# Patient Record
Sex: MALE | Race: WHITE | NOT HISPANIC OR LATINO | Employment: FULL TIME | URBAN - METROPOLITAN AREA
[De-identification: names, ages, dates, MRNs, and addresses within clinical notes are randomized per-mention and may not be internally consistent; named-entity substitution may affect disease eponyms.]

---

## 2017-01-09 RX ORDER — AZELASTINE HCL 205.5 UG/1
2 SPRAY NASAL 2 TIMES DAILY
Qty: 30 ML | Refills: 3 | Status: SHIPPED | OUTPATIENT
Start: 2017-01-09 | End: 2017-10-02 | Stop reason: SDUPTHER

## 2017-02-17 RX ORDER — MONTELUKAST SODIUM 10 MG/1
10 TABLET ORAL NIGHTLY
Qty: 90 TABLET | Refills: 1 | Status: SHIPPED | OUTPATIENT
Start: 2017-02-17 | End: 2017-02-20 | Stop reason: SDUPTHER

## 2017-02-20 RX ORDER — MONTELUKAST SODIUM 10 MG/1
10 TABLET ORAL NIGHTLY
Qty: 90 TABLET | Refills: 1 | Status: SHIPPED | OUTPATIENT
Start: 2017-02-20 | End: 2017-02-21 | Stop reason: SDUPTHER

## 2017-02-21 RX ORDER — MONTELUKAST SODIUM 10 MG/1
10 TABLET ORAL NIGHTLY
Qty: 90 TABLET | Refills: 1 | Status: SHIPPED | OUTPATIENT
Start: 2017-02-21 | End: 2018-04-23 | Stop reason: CLARIF

## 2017-05-12 ENCOUNTER — OFFICE VISIT (OUTPATIENT)
Dept: INTERNAL MEDICINE | Facility: CLINIC | Age: 47
End: 2017-05-12

## 2017-05-12 VITALS
SYSTOLIC BLOOD PRESSURE: 122 MMHG | WEIGHT: 250 LBS | TEMPERATURE: 98.7 F | DIASTOLIC BLOOD PRESSURE: 80 MMHG | BODY MASS INDEX: 35 KG/M2 | OXYGEN SATURATION: 98 % | HEART RATE: 87 BPM | HEIGHT: 71 IN | RESPIRATION RATE: 16 BRPM

## 2017-05-12 DIAGNOSIS — I10 ESSENTIAL HYPERTENSION: Primary | ICD-10-CM

## 2017-05-12 DIAGNOSIS — J30.1 SEASONAL ALLERGIC RHINITIS DUE TO POLLEN: ICD-10-CM

## 2017-05-12 PROCEDURE — 99214 OFFICE O/P EST MOD 30 MIN: CPT | Performed by: INTERNAL MEDICINE

## 2017-05-17 ENCOUNTER — RESULTS ENCOUNTER (OUTPATIENT)
Dept: INTERNAL MEDICINE | Facility: CLINIC | Age: 47
End: 2017-05-17

## 2017-05-17 DIAGNOSIS — I10 ESSENTIAL HYPERTENSION: ICD-10-CM

## 2017-10-02 RX ORDER — AZELASTINE HCL 205.5 UG/1
SPRAY NASAL
Qty: 3 EACH | Refills: 0 | Status: SHIPPED | OUTPATIENT
Start: 2017-10-02 | End: 2018-12-20 | Stop reason: SDUPTHER

## 2017-10-02 RX ORDER — AZELASTINE HCL 205.5 UG/1
SPRAY NASAL
Qty: 30 ML | Refills: 3 | Status: SHIPPED | OUTPATIENT
Start: 2017-10-02 | End: 2018-10-08 | Stop reason: SDUPTHER

## 2017-12-18 RX ORDER — LISINOPRIL AND HYDROCHLOROTHIAZIDE 20; 12.5 MG/1; MG/1
TABLET ORAL
Qty: 90 TABLET | Refills: 3 | Status: SHIPPED | OUTPATIENT
Start: 2017-12-18 | End: 2019-02-09 | Stop reason: SDUPTHER

## 2018-02-21 ENCOUNTER — TELEPHONE (OUTPATIENT)
Dept: INTERNAL MEDICINE | Facility: CLINIC | Age: 48
End: 2018-02-21

## 2018-02-21 DIAGNOSIS — G47.33 OSA (OBSTRUCTIVE SLEEP APNEA): Primary | ICD-10-CM

## 2018-02-21 NOTE — TELEPHONE ENCOUNTER
Order entered for home sleep study to qualify.    ----- Message from Clarisse Honeycutt sent at 2/21/2018  8:47 AM EST -----  Regarding: SLEEP STUDY  Contact: [phone]  [facility]    Patient needs order for another sleep study here at [facility]. He is having a problem with his cpap equipment and they told him that he has to have another study to make sure that they order the correct equipment.    Thanks!  clarisse

## 2018-03-16 ENCOUNTER — OFFICE VISIT (OUTPATIENT)
Dept: SLEEP MEDICINE | Facility: HOSPITAL | Age: 48
End: 2018-03-16
Attending: INTERNAL MEDICINE

## 2018-03-16 ENCOUNTER — TELEPHONE (OUTPATIENT)
Dept: SLEEP MEDICINE | Facility: HOSPITAL | Age: 48
End: 2018-03-16

## 2018-03-16 VITALS
BODY MASS INDEX: 36.96 KG/M2 | SYSTOLIC BLOOD PRESSURE: 135 MMHG | HEART RATE: 83 BPM | DIASTOLIC BLOOD PRESSURE: 74 MMHG | WEIGHT: 264 LBS | HEIGHT: 71 IN

## 2018-03-16 DIAGNOSIS — Z99.89 OSA ON CPAP: Primary | ICD-10-CM

## 2018-03-16 DIAGNOSIS — E66.9 OBESITY (BMI 30-39.9): ICD-10-CM

## 2018-03-16 DIAGNOSIS — G47.10 HYPERSOMNIA WITH SLEEP APNEA: ICD-10-CM

## 2018-03-16 DIAGNOSIS — G47.33 OSA ON CPAP: Primary | ICD-10-CM

## 2018-03-16 DIAGNOSIS — G47.30 HYPERSOMNIA WITH SLEEP APNEA: ICD-10-CM

## 2018-03-16 DIAGNOSIS — Z72.820 SLEEP DEPRIVATION: ICD-10-CM

## 2018-03-16 DIAGNOSIS — I10 BENIGN ESSENTIAL HTN: ICD-10-CM

## 2018-03-16 PROCEDURE — G0463 HOSPITAL OUTPT CLINIC VISIT: HCPCS

## 2018-03-16 NOTE — PROGRESS NOTES
Sleep Consultation    Patient Name: Jitendra Castillo  Age/Sex: 48 y.o. male  : 1970  MRN: [id number]    Date of Encounter Visit: 2018  Encounter Provider: Romel Vieira MD  Referring Provider: Romel Vieira MD  Place of Service: [facility]  Patient Care Team:  Martita Nunes MD as PCP - General (Internal Medicine & Pediatrics)    Subjective:     Reason for Consult: follow up and re evaluation on MELLO    History of Present Illness:  Jitendra Castillo is a 48 y.o. male is here for evaluation of MELLO .Patient was evaluated at the [place] [facility] back in  when he was seen by Dr. Miramontes and he had moderate sleep apnea with AHI of 25 with good control on a CPAP of 10, back then his body weight was reported at 234 pound as compared to 264 pounds today he did  Patient was started on a CPAP of 10 and he felt better on it slipped better with it and was compliant with it as evident on his download today.  His machine is old and is making some weird noises, he has some residual snoring that is reported while on the machine lately per his wife.  Patient also need to have supplies filters and mask etc., and his [employer] company has requested a physician order in order to be able to supply them.  Patient is having some hypersomnia at this point despite being on the CPAP and his Row[place] sleepiness scale is abnormal at 12 however based on the download you can see that on his days off he sleeps for more than 8-9 hours reflecting a chronic sleep deprivation that he is trying to compensate for.  Besides the hypertension patient denies any other active medical problems and his blood pressure seems to be well controlled on medication.    Please see sleep questionnaire on page 2 for more details.   Patient complains of daytime fatigue and sleepiness with an [place] Sleepiness Scale (ESS) of 12.  Patient complaints were a lot worse and they did improve with the CPAP  Denies any symptoms of  restless leg syndrome.   Patient denies any cataplexy, sleep paralysis or other symptoms to suggest narcolepsy.  Patient denies any parasomnias.  Denies any history of seizure disorder or recent head trauma.  Patient spends adequate time in bed as discussed above with resulting chronic sleep deprivation  Comorbidities include: Obesity and essential systemic hypertension    Review of Systems:   A twelve-system review was conducted and was negative .   Please refer to page 4 of on the sleep questionnaire for more details on system review findings.    Past Medical History:  Past Medical History:   Diagnosis Date   • Allergic    • Hypertension        Past Surgical History:   Procedure Laterality Date   • FINGER SURGERY         Home Medications:     Current Outpatient Prescriptions:   •  azelastine (ASTEPRO) 0.15 % solution nasal spray, USE TWO SPRAY(S) INTO EACH NOSTRIL TWICE DAILY, Disp: 3 each, Rfl: 0  •  azelastine (ASTEPRO) 0.15 % solution nasal spray, USE TWO SPRAY(S) INTO EACH NOSTRIL TWICE DAILY, Disp: 30 mL, Rfl: 3  •  lisinopril-hydrochlorothiazide (PRINZIDE,ZESTORETIC) 20-12.5 MG per tablet, TAKE ONE TABLET BY MOUTH ONCE DAILY, Disp: 90 tablet, Rfl: 3  •  montelukast (SINGULAIR) 10 MG tablet, Take 1 tablet by mouth Every Night., Disp: 90 tablet, Rfl: 1  •  Multiple Vitamin (DAILY VITAMIN PO), Take  by mouth Daily., Disp: , Rfl:     Allergies:  No Known Allergies    Past Social History:  Social History     Social History   • Marital status:      Social History Main Topics   • Smoking status: Former Smoker     Packs/day: 2.00     Years: 5.00     Types: Cigarettes     Quit date: 1993   • Alcohol use Yes      Comment: 5 per week    • Drug use: No   • Sexual activity: Yes     Partners: Female     Other Topics Concern   • Not on file       Past Family History:  Family History   Problem Relation Age of Onset   • Heart disease Father    • Hypertension Father    • Heart attack Father      s/p cabg in late 50's  "  • Alzheimer's disease Mother    • No Known Problems Sister    • Hypertension Brother    • No Known Problems Maternal Grandmother    • No Known Problems Maternal Grandfather    • No Known Problems Paternal Grandmother    • No Known Problems Paternal Grandfather    • Nephrolithiasis Brother        Objective:   Done and documented on sleep disorders center physical examination sheet, please refer to the hand written note on records for details about the pertinent negative findings.    Vital Signs:   Visit Vitals  /74   Pulse 83   Ht 180.3 cm (71\")   Wt 120 kg (264 lb)   BMI 36.82 kg/m²     Wt Readings from Last 3 Encounters:   03/16/18 120 kg (264 lb)   05/12/17 113 kg (250 lb)   11/04/16 112 kg (247 lb 9.6 oz)     Neck Circumference: 18.5 inches    Physical Exam:   General: AAOx3. Normal mood and affect.   HEENT:  Conjunctiva normal.  PERRLA.  Moist mucous membranes.  Septum midline. Mallampati IV airway.  Large uvula   Neck: Neck supple. Trachea midline.  Normal thyroid.  Normal dentition  LUNGS: Non-labored breathing. CTAB.  No wheezing.  HEART: regular rate and rhythm. No murmur. Normal s1/s2.  EXTREMITIES: No edema. No cyanosis or clubbing. Normal gait.    Diagnostic Data:  NPSG/CPAP split study on 2/27/2012:      Compliance download 3/15/18: Over the last 90 days patient has 100% compliance with average nightly use of 6 hours and 57 minutes with atypical pattern showing more sleep on the weekends suggestive of compensation for chronic sleep deprivation.  His CPAP machine is an old machine and does not have any feedback about the air leak or the residual apneas.      Assessment and Plan:       ICD-10-CM ICD-9-CM   1. MELLO on CPAP G47.33 327.23    Z99.89 V46.8   2. Hypersomnia with sleep apnea G47.10 780.53    G47.30    3. Obesity (BMI 30-39.9) E66.9 278.00   4. Benign essential HTN I10 401.1   5. Sleep deprivation Z72.820 V69.4       Recommendations:     Patient has gained nearly 30 pounds since his initial " titration study and his wife is reporting some snoring on his current CPAP at 10 cm his machine is also more than 5 years old.  The patient reporting that the machine is making some noises and it is about time to replace it and I would suggest to go with an auto CPAP 10-20 to compensate for the weight gain.  We'll also do an overnight oximetry on the newer machine to make sure that there is no more desaturation or hypoxemia.  Patient was advised about the impact of weight on sleep apnea and benefit of weight loss and weight loss is recommended especially given the recent increase in his weight.  Patient had some persistent hypersomnia which might be secondary to his chronic sleep deprivation and was advised to spend adequate time in bed.  We'll proceed with the new machine and the overnight oximetry and will follow-up in 2 months with further recommendation accordingly, meanwhile patient was also instructed to ask the wife for a feedback about whether he still snores or not on the newer machine  low up within 31-90 days after starting CPAP for compliance review.     No orders of the defined types were placed in this encounter.      Return in about 2 months (around 5/16/2018).    Romel Vieira MD   [facility]   03/16/18  9:54 AM    Dictated utilizing Dragon dictation:  Much of this encounter note is an electronic transcription/translation of spoken language to printed text. The electronic translation of spoken language may permit erroneous, or at times, nonsensical words or phrases to be inadvertently transcribed; Although I have reviewed the note for such errors, some may still exist.

## 2018-04-23 RX ORDER — MONTELUKAST SODIUM 10 MG/1
TABLET ORAL
Qty: 90 TABLET | Refills: 1 | Status: SHIPPED | OUTPATIENT
Start: 2018-04-23 | End: 2018-12-31 | Stop reason: SDUPTHER

## 2018-05-18 ENCOUNTER — OFFICE VISIT (OUTPATIENT)
Dept: SLEEP MEDICINE | Facility: HOSPITAL | Age: 48
End: 2018-05-18
Attending: INTERNAL MEDICINE

## 2018-05-18 VITALS
HEART RATE: 74 BPM | BODY MASS INDEX: 35.84 KG/M2 | HEIGHT: 71 IN | SYSTOLIC BLOOD PRESSURE: 123 MMHG | DIASTOLIC BLOOD PRESSURE: 71 MMHG | WEIGHT: 256 LBS

## 2018-05-18 DIAGNOSIS — Z72.820 SLEEP DEPRIVATION: ICD-10-CM

## 2018-05-18 DIAGNOSIS — Z99.89 OSA ON CPAP: Primary | ICD-10-CM

## 2018-05-18 DIAGNOSIS — G47.30 HYPERSOMNIA WITH SLEEP APNEA: ICD-10-CM

## 2018-05-18 DIAGNOSIS — I10 BENIGN ESSENTIAL HTN: ICD-10-CM

## 2018-05-18 DIAGNOSIS — G47.10 HYPERSOMNIA WITH SLEEP APNEA: ICD-10-CM

## 2018-05-18 DIAGNOSIS — E66.9 OBESITY (BMI 30-39.9): ICD-10-CM

## 2018-05-18 DIAGNOSIS — G47.33 OSA ON CPAP: Primary | ICD-10-CM

## 2018-05-18 PROCEDURE — G0463 HOSPITAL OUTPT CLINIC VISIT: HCPCS

## 2018-05-18 NOTE — PROGRESS NOTES
Sleep Disorder Follow Up    Patient Name: Jitendra Castillo  Age/Sex: 48 y.o. male  : 1970  MRN: [id number]    Date of Encounter Visit: 18  Referring Provider: Romel Vieira MD  Place of Service: [facility]  Patient Care Team:  Martita Nunes MD as PCP - General (Internal Medicine & Pediatrics)    PROBLEM LIST:  1. MELLO on CPAP      2. Hypersomnia with sleep apnea      3. Obesity (BMI 30-39.9)   4. Benign essential HTN   5. Sleep deprivation       History of Present Illness:  Jitendra Castillo is a 48 y.o. male who was last seen in the office on 3/16/2018 . Patient was evaluated at the [place] [facility] [place] back in  when he was seen by Dr. Miramontes and he had moderate sleep apnea with AHI of 25 with good control on a CPAP of 10, back then his body weight was reported at 234 pound as compared to 264 pounds today he did    Since last visit, patient had his old CPAP machine replaced with an auto CPAP 10-20 instead of a fixed pressure of 10 and he was instructed to increase the time in bed and help offset some of his chronic sleep deprivation.  Patient is feeling better on the CPAP he likes the new machine, he sleeps better, he is still sleep deprived however, and he still have a borderline hypersomnia which should not improve without increase in the total sleep time a respective of the CPAP usage.    Patient is on  CPAP and uses it every night with no complaints from the mask or the pressure.  Patient uses a nasal pillow, which does  fit well. Patient denies any significant complaints regarding the air leak.  No dry mouth.   Patient's equipment supplier is [facility] and last mask replacement was after the recent set up in 2018.  Patient sleeps better and has a deeper sleep with the  CPAP and feels more energy during the day time.  Current  CPAP setting auto 10-20 cm H20.  [place] Sleepiness Scale (ESS) is 10.  Compliance download was reviewed and documented below.  Weight is  up by 12 pounds since last visit.  Other comorbidities include MELLO/obesity/hypertension     Review of Systems:   A ten-system review was conducted and was negative except for some residual sleepiness..   Please refer to the follow up sleep questionnaire page one for details.    Past Medical History:  Past medical, surgical, social, and family history, except as mentioned above, was unchanged from the last visit.     Past Medical History:   Diagnosis Date   • Allergic    • Hypertension        Past Surgical History:   Procedure Laterality Date   • FINGER SURGERY         Home Medications:     Current Outpatient Prescriptions:   •  azelastine (ASTEPRO) 0.15 % solution nasal spray, USE TWO SPRAY(S) INTO EACH NOSTRIL TWICE DAILY, Disp: 3 each, Rfl: 0  •  azelastine (ASTEPRO) 0.15 % solution nasal spray, USE TWO SPRAY(S) INTO EACH NOSTRIL TWICE DAILY, Disp: 30 mL, Rfl: 3  •  lisinopril-hydrochlorothiazide (PRINZIDE,ZESTORETIC) 20-12.5 MG per tablet, TAKE ONE TABLET BY MOUTH ONCE DAILY, Disp: 90 tablet, Rfl: 3  •  montelukast (SINGULAIR) 10 MG tablet, TAKE ONE TABLET BY MOUTH EVERY NIGHT, Disp: 90 tablet, Rfl: 1  •  Multiple Vitamin (DAILY VITAMIN PO), Take  by mouth Daily., Disp: , Rfl:     Allergies:  No Known Allergies    Past Social History:  Social History     Social History   • Marital status:      Social History Main Topics   • Smoking status: Former Smoker     Packs/day: 2.00     Years: 5.00     Types: Cigarettes     Quit date: 1993   • Alcohol use Yes      Comment: 5 per week    • Drug use: No   • Sexual activity: Yes     Partners: Female     Other Topics Concern   • Not on file       Past Family History:  Family History   Problem Relation Age of Onset   • Heart disease Father    • Hypertension Father    • Heart attack Father         s/p cabg in late 50's   • Alzheimer's disease Mother    • No Known Problems Sister    • Hypertension Brother    • No Known Problems Maternal Grandmother    • No Known Problems  "Maternal Grandfather    • No Known Problems Paternal Grandmother    • No Known Problems Paternal Grandfather    • Nephrolithiasis Brother          Objective:   Done and documented on sleep disorders center physical examination sheet, please refer to hand written note on the chart for details about the other pertinent negative findings.    Vital Signs:   Visit Vitals  /71   Pulse 74   Ht 180.3 cm (71\")   Wt 116 kg (256 lb)   BMI 35.70 kg/m²     Wt Readings from Last 3 Encounters:   05/18/18 116 kg (256 lb)   03/16/18 120 kg (264 lb)   05/12/17 113 kg (250 lb)          Physical Exam:   General: AAOx3. Normal mood and affect.   HEENT:  Moist mucous membranes.  Septum midline. Mallampati IV airway.  Enlarged uvula.   LUNGS: Non-labored breathing. CTAB. No wheezes.  HEART: Regular rate and rhythm. No murmur. Normal s1/s2  EXTREMITIES: no edema. No cyanosis or clubbing. Normal gait    Diagnostic Data:  NPSG/CPAP split study on 2/27/2012:        Compliance download from 4/18/18-5/17/18 showed 100% compliant with average use of 6 hours and 21 minutes per night. Residual AHI of 1.5 on auto CPAP at 10-20 cm H20 with 4 minutes and 48 seconds of average time in large air leak per day. Majority (90%) of the time the pressure is staying at or below 12.8 cm H2O and a mean pressure of 11.1 cm H2O.      Assessment and Plan:       ICD-10-CM ICD-9-CM   1. MELLO on CPAP G47.33 327.23    Z99.89 V46.8   2. Hypersomnia with sleep apnea G47.10 780.53    G47.30    3. Obesity (BMI 30-39.9) E66.9 278.00   4. Benign essential HTN I10 401.1   5. Sleep deprivation Z72.820 V69.4       Recommendations:     Compliant with the CPAP and using it regularly and adequately for medical benefit and the treatment is recommended to be continued  Patient is still sleep deprived and that account for the residual hypersomnia, re-education provided on the negative impact of chronic sleep deprivation with the instruction to increase the time in bed.  Patient " used to have snoring on the old machine but that is no longer an issue on the new CPAP except rarely, the setting on the machine is adequate for optimal control and no further adjustments are needed  Patient is to work on the weight loss  We'll continue to follow-up on a yearly basis or sooner as needed    No orders of the defined types were placed in this encounter.    Return in about 1 year (around 5/18/2019).    Romel Vieira MD   [facility]   05/18/18  12:45 PM    Dictated utilizing Dragon dictation:  Much of this encounter note is an electronic transcription/translation of spoken language to printed text. The electronic translation of spoken language may permit erroneous, or at times, nonsensical words or phrases to be inadvertently transcribed; Although I have reviewed the note for such errors, some may still exist.

## 2018-10-08 ENCOUNTER — OFFICE VISIT (OUTPATIENT)
Dept: INTERNAL MEDICINE | Facility: CLINIC | Age: 48
End: 2018-10-08

## 2018-10-08 ENCOUNTER — HOSPITAL ENCOUNTER (OUTPATIENT)
Dept: GENERAL RADIOLOGY | Facility: HOSPITAL | Age: 48
Discharge: HOME | End: 2018-10-08
Attending: INTERNAL MEDICINE | Admitting: INTERNAL MEDICINE

## 2018-10-08 VITALS
HEART RATE: 89 BPM | OXYGEN SATURATION: 98 % | DIASTOLIC BLOOD PRESSURE: 98 MMHG | SYSTOLIC BLOOD PRESSURE: 140 MMHG | HEIGHT: 71 IN

## 2018-10-08 DIAGNOSIS — M79.672 LEFT FOOT PAIN: Primary | ICD-10-CM

## 2018-10-08 DIAGNOSIS — S92.355A CLOSED NONDISPLACED FRACTURE OF FIFTH METATARSAL BONE OF LEFT FOOT, INITIAL ENCOUNTER: ICD-10-CM

## 2018-10-08 PROCEDURE — 99214 OFFICE O/P EST MOD 30 MIN: CPT | Performed by: INTERNAL MEDICINE

## 2018-10-08 PROCEDURE — 73630 X-RAY EXAM OF FOOT: CPT

## 2018-10-08 NOTE — PROGRESS NOTES
Jitendra Castillo is a 48 y.o. male, who presents with a chief complaint of   Chief Complaint   Patient presents with   • Foot Injury     Rolled L foot yesterday, pain on outside of foot       HPI   Pt rolled foot yesterday on uneven ground.  The lateral side or his left foot hurts.  He can put pressure on heel but not on rest of foot.  He is having to use crutches.  He is taking ibuprofen.  As long as he doesn't walk on foot it's ok.       The following portions of the patient's history were reviewed and updated as appropriate: allergies, current medications, past family history, past medical history, past social history, past surgical history and problem list.    Allergies: Patient has no known allergies.    Review of Systems   Constitutional: Negative.    HENT: Negative.    Eyes: Negative.    Respiratory: Negative.    Cardiovascular: Negative.    Gastrointestinal: Negative.    Endocrine: Negative.    Genitourinary: Negative.    Musculoskeletal:        Ankle pain   Skin: Negative.    Allergic/Immunologic: Negative.    Neurological: Negative.    Hematological: Negative.    Psychiatric/Behavioral: Negative.    All other systems reviewed and are negative.            Wt Readings from Last 3 Encounters:   05/18/18 116 kg (256 lb)   03/16/18 120 kg (264 lb)   05/12/17 113 kg (250 lb)     Temp Readings from Last 3 Encounters:   05/12/17 98.7 °F (37.1 °C) (Oral)     BP Readings from Last 3 Encounters:   10/08/18 140/98   05/18/18 123/71   03/16/18 135/74     Pulse Readings from Last 3 Encounters:   10/08/18 89   05/18/18 74   03/16/18 83     There is no height or weight on file to calculate BMI.  @LASTSAO2(3)@    Physical Exam   Constitutional: He is oriented to person, place, and time. He appears well-developed and well-nourished. No distress.   HENT:   Head: Normocephalic and atraumatic.   Right Ear: External ear normal.   Left Ear: External ear normal.   Nose: Nose normal.   Mouth/Throat: Oropharynx is clear and  moist.   Eyes: Pupils are equal, round, and reactive to light. Conjunctivae and EOM are normal.   Neck: Normal range of motion. Neck supple.   Cardiovascular: Normal rate, regular rhythm, normal heart sounds and intact distal pulses.    Pulmonary/Chest: Effort normal and breath sounds normal. No respiratory distress. He has no wheezes.   Musculoskeletal: He exhibits edema.   On crutches  + ttp over left 5th metatarsal.   Neurological: He is alert and oriented to person, place, and time.   Skin: Skin is warm and dry.   Psychiatric: He has a normal mood and affect. His behavior is normal. Judgment and thought content normal.   Nursing note and vitals reviewed.      Results for orders placed or performed in visit on 09/30/16   Comprehensive Metabolic Panel   Result Value Ref Range    Glucose 84 65 - 99 mg/dL    BUN 8 6 - 20 mg/dL    Creatinine 0.81 0.76 - 1.27 mg/dL    eGFR Non African Am 104 >60 mL/min/1.73    eGFR African Am 125 >60 mL/min/1.73    BUN/Creatinine Ratio 9.9 7.0 - 25.0    Sodium 140 136 - 145 mmol/L    Potassium 3.8 3.5 - 5.2 mmol/L    Chloride 97 (L) 98 - 107 mmol/L    Total CO2 28.2 22.0 - 29.0 mmol/L    Calcium 9.1 8.6 - 10.5 mg/dL    Total Protein 7.8 6.0 - 8.5 g/dL    Albumin 4.70 3.50 - 5.20 g/dL    Globulin 3.1 gm/dL    A/G Ratio 1.5 g/dL    Total Bilirubin 0.7 0.2 - 1.2 mg/dL    Alkaline Phosphatase 61 40 - 129 U/L    AST (SGOT) 27 5 - 40 U/L    ALT (SGPT) 27 5 - 41 U/L   T4, Free   Result Value Ref Range    Free T4 0.96 0.93 - 1.70 ng/dL   TSH   Result Value Ref Range    TSH 3.130 0.270 - 4.200 mIU/mL   Lipid Panel With LDL / HDL Ratio   Result Value Ref Range    Total Cholesterol 173 0 - 200 mg/dL    Triglycerides 103 0 - 150 mg/dL    HDL Cholesterol 54 40 - 60 mg/dL    VLDL Cholesterol 20.6 8 - 32 mg/dL    LDL Cholesterol  98 0 - 100 mg/dL    LDL/HDL Ratio 1.82    CBC & Differential   Result Value Ref Range    WBC 6.99 4.80 - 10.80 10*3/mm3    RBC 5.13 4.70 - 6.10 10*6/mm3    Hemoglobin 16.1  14.0 - 18.0 g/dL    Hematocrit 45.1 42.0 - 52.0 %    MCV 87.9 80.0 - 94.0 fL    MCH 31.4 (H) 27.0 - 31.0 pg    MCHC 35.7 31.0 - 37.0 g/dL    RDW 12.9 11.5 - 14.5 %    Platelets 286 140 - 500 10*3/mm3    Neutrophil Rel % 56.0 45.0 - 70.0 %    Lymphocyte Rel % 31.6 20.0 - 45.0 %    Monocyte Rel % 7.6 3.0 - 8.0 %    Eosinophil Rel % 3.4 0.0 - 4.0 %    Basophil Rel % 1.1 0.0 - 2.0 %    Neutrophils Absolute 3.91 1.50 - 8.30 10*3/mm3    Lymphocytes Absolute 2.21 0.60 - 4.80 10*3/mm3    Monocytes Absolute 0.53 0.00 - 1.00 10*3/mm3    Eosinophils Absolute 0.24 0.10 - 0.30 10*3/mm3    Basophils Absolute 0.08 0.00 - 0.20 10*3/mm3    Immature Granulocyte Rel % 0.3 0.0 - 0.5 %    Immature Grans Absolute 0.02 0.00 - 0.03 10*3/mm3    nRBC 0.0 0.0 - 0.0 /100 WBC           Jitendra was seen today for foot injury.    Diagnoses and all orders for this visit:    Left foot pain  -     XR Foot 3+ View Left      nsaids prn. xrays today.  If foot fractured will get pt walking boot and refer to ortho.        Addendum: pt with fracture of 5th metatarsal.  We were able to get a boot from ortho and pt fitted in boot.  he has a follow up appt tomorrow with Elidia Lewis the ortho NP.  Images reviewed with pt.    Pt voiced understanding of plan    25 min spent in patient care with >50% spent in counseling about metatarsal fracture including imaging, boot/immobilization/ortho follow up.    Outpatient Medications Prior to Visit   Medication Sig Dispense Refill   • azelastine (ASTEPRO) 0.15 % solution nasal spray USE TWO SPRAY(S) INTO EACH NOSTRIL TWICE DAILY 3 each 0   • lisinopril-hydrochlorothiazide (PRINZIDE,ZESTORETIC) 20-12.5 MG per tablet TAKE ONE TABLET BY MOUTH ONCE DAILY 90 tablet 3   • montelukast (SINGULAIR) 10 MG tablet TAKE ONE TABLET BY MOUTH EVERY NIGHT 90 tablet 1   • Multiple Vitamin (DAILY VITAMIN PO) Take  by mouth Daily.     • azelastine (ASTEPRO) 0.15 % solution nasal spray USE TWO SPRAY(S) INTO EACH NOSTRIL TWICE DAILY 30 mL 3      No facility-administered medications prior to visit.      No orders of the defined types were placed in this encounter.    [unfilled]  Medications Discontinued During This Encounter   Medication Reason   • azelastine (ASTEPRO) 0.15 % solution nasal spray Duplicate order         Return if symptoms worsen or fail to improve.

## 2018-10-09 ENCOUNTER — OFFICE VISIT (OUTPATIENT)
Dept: ORTHOPEDIC SURGERY | Facility: CLINIC | Age: 48
End: 2018-10-09

## 2018-10-09 VITALS
HEIGHT: 71 IN | HEART RATE: 77 BPM | DIASTOLIC BLOOD PRESSURE: 84 MMHG | BODY MASS INDEX: 33.6 KG/M2 | WEIGHT: 240 LBS | SYSTOLIC BLOOD PRESSURE: 136 MMHG

## 2018-10-09 DIAGNOSIS — S92.355A CLOSED NONDISPLACED FRACTURE OF FIFTH METATARSAL BONE OF LEFT FOOT, INITIAL ENCOUNTER: Primary | ICD-10-CM

## 2018-10-09 PROCEDURE — 99203 OFFICE O/P NEW LOW 30 MIN: CPT | Performed by: NURSE PRACTITIONER

## 2018-10-09 PROCEDURE — 28470 CLTX METATARSAL FX WO MNP EA: CPT | Performed by: NURSE PRACTITIONER

## 2018-10-09 RX ORDER — FLUTICASONE PROPIONATE 50 MCG
2 SPRAY, SUSPENSION (ML) NASAL DAILY
COMMUNITY

## 2018-10-09 NOTE — PROGRESS NOTES
Subjective:     Patient ID: Jitendra Castillo is a 48 y.o. male.    Chief Complaint:  Nondisplaced fracture fifth metatarsal, left foot    History of Present Illness  Jitendra Castillo is a 48-year-old male who presents with a 2 day history of pain left foot.  He is ambulating on her dog on uneven surface when he twisted his foot.  He was seen by primary care provider x-rays were completed and was encouraged follow-up in our office.  He was provided with walking boot by his primary care provider one day ago.  Maximal pain over the fifth metatarsal on the left foot.  He has been nonweightbearing to the left foot using a knee scooter to assist with ambulating.  Rates pain at a 5 out of a 10 aching and throbbing in nature.  Denies presence of numbness, tingling and all other concerns the left lower extremity.  He is been taking ibuprofen as needed for symptom relief.       Social History     Occupational History   • Not on file.     Social History Main Topics   • Smoking status: Former Smoker     Packs/day: 2.00     Years: 5.00     Types: Cigarettes     Quit date: 1993   • Smokeless tobacco: Not on file   • Alcohol use Yes      Comment: 5 per week    • Drug use: No   • Sexual activity: Yes     Partners: Female      Past Medical History:   Diagnosis Date   • Allergic    • Hypertension      Past Surgical History:   Procedure Laterality Date   • FINGER SURGERY     • SHOULDER SURGERY     • TENDON REPAIR Right 07/17/2018    Right Bicep       Family History   Problem Relation Age of Onset   • Heart disease Father    • Hypertension Father    • Heart attack Father         s/p cabg in late 50's   • Alzheimer's disease Mother    • No Known Problems Sister    • Hypertension Brother    • No Known Problems Maternal Grandmother    • No Known Problems Maternal Grandfather    • No Known Problems Paternal Grandmother    • No Known Problems Paternal Grandfather    • Nephrolithiasis Brother          Review of Systems   Constitutional: Negative  "for chills, diaphoresis, fever and unexpected weight change.   HENT: Negative for hearing loss, nosebleeds, sore throat and tinnitus.    Eyes: Negative for pain and visual disturbance.   Respiratory: Negative for cough, shortness of breath and wheezing.    Cardiovascular: Negative for chest pain and palpitations.   Gastrointestinal: Negative for abdominal pain, diarrhea, nausea and vomiting.   Endocrine: Negative for cold intolerance, heat intolerance and polydipsia.   Genitourinary: Negative for difficulty urinating, dysuria and hematuria.   Musculoskeletal: Positive for arthralgias. Negative for joint swelling and myalgias.   Skin: Negative for rash and wound.   Allergic/Immunologic: Negative for environmental allergies.   Neurological: Negative for dizziness, syncope and numbness.   Hematological: Does not bruise/bleed easily.   Psychiatric/Behavioral: Negative for dysphoric mood and sleep disturbance. The patient is not nervous/anxious.            Objective:  Physical Exam    Vital signs reviewed.   General: No acute distress.  Eyes: conjunctiva clear; pupils equally round and reactive  ENT: external ears and nose atraumatic; oropharynx clear  CV: no peripheral edema  Resp: normal respiratory effort  Skin: no rashes or wounds; normal turgor  Psych: mood and affect appropriate; recent and remote memory intact    Vitals:    10/09/18 1344   BP: 136/84   Pulse: 77   Weight: 109 kg (240 lb)   Height: 180.3 cm (71\")     1    10/09/18  1344   Weight: 109 kg (240 lb)     Body mass index is 33.47 kg/m².      Ortho Exam     Left foot exam  Maximal tenderness proximal aspect of fifth metatarsal  Mild swelling noted  Negative for erythema  Positive sensation all distributions of left foot  Negative Abad's squeeze test  Negative Homans sign, negative calf tenderness  Flex and extend all digits with pain over fifth metatarsal  2+ pulses  Brisk cap refill all digits left foot    Imaging:   Reviewed x-rays previously " completed at [facility] on 10/8/2018  Nondisplaced fracture fifth metatarsal left foot  Assessment:        1. Closed nondisplaced fracture of fifth metatarsal bone of left foot, initial encounter           Plan:  1.  Discussed plan of care with patient.  We'll continue with nonweightbearing the left lower extremity.  He may continue use of fracture boot and encouraged to continue with knee Rollator.  We'll plan to follow-up with him in 3 weeks.  We will need to repeat x-rays of her left foot out of boot at that time.  Encouraged to continue with ice, elevation, rest, ibuprofen as needed for pain.  Patient verbalized understanding of all information agrees with plan of care.  Denies other concerns at this time  Orders:  No orders of the defined types were placed in this encounter.      I ordered and reviewed the LEVI today.     Dictated utilizing Dragon dictation

## 2018-10-15 ENCOUNTER — TELEPHONE (OUTPATIENT)
Dept: ORTHOPEDIC SURGERY | Facility: CLINIC | Age: 48
End: 2018-10-15

## 2018-10-15 NOTE — TELEPHONE ENCOUNTER
Patient was seen by you last week for a foot fracture.  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  Would this or could this cause a problem with his condition?  Should he reschedule the meeting?

## 2018-10-30 ENCOUNTER — OFFICE VISIT (OUTPATIENT)
Dept: ORTHOPEDIC SURGERY | Facility: CLINIC | Age: 48
End: 2018-10-30

## 2018-10-30 VITALS — BODY MASS INDEX: 33.6 KG/M2 | WEIGHT: 240 LBS | HEIGHT: 71 IN

## 2018-10-30 DIAGNOSIS — S92.355D CLOSED NONDISPLACED FRACTURE OF FIFTH METATARSAL BONE OF LEFT FOOT WITH ROUTINE HEALING, SUBSEQUENT ENCOUNTER: Primary | ICD-10-CM

## 2018-10-30 PROCEDURE — 73630 X-RAY EXAM OF FOOT: CPT | Performed by: NURSE PRACTITIONER

## 2018-10-30 PROCEDURE — 99024 POSTOP FOLLOW-UP VISIT: CPT | Performed by: NURSE PRACTITIONER

## 2018-10-30 NOTE — PROGRESS NOTES
CC: follow-up nondisplaced fracture fifth metatarsal, left foot 10/07/2018    Interval history: Jitendra Castillo presents back to clinic for three week follow-up left foot. Has continued with use of fracture boot left foot and nonweightbearing. He continues to use knee scooter for transportation. Significantly decreased swelling lateral foot and pain is improving. Denies presence of numbness, tingling and all other concerns.     Exam:  Left foot examined out of boot  Tenderness over fifth metatarsal, plantar surface  Negative swelling, negative erythema   Positive sensation all distributions of left foot  Negative Abad's squeeze test  Negative Homans sign, negative calf tenderness  Flex and extend all digits with pain over fifth metatarsal  2+ pulses  Brisk cap refill all digits left foot    Imaging:  Left Foot X-Ray  Indication: Pain  AP, Lateral, and Oblique views    Findings:  Nondisplaced fracture fifth metatarsal, no significant callus formation yet noted   No bony lesion  Normal soft tissues  Normal joint spaces    Prior studies were available for comparison.    Assessment: closed nondisplaced fracture fifth metatarsal left foot, subsequent encounter    Plan:  1. Discussed plan of care with patient. Will continue with nonweightbearing activities left lower extremity.   2. Encouraged ibuprofen as needed for swelling and pain.  3. Will plan to see him back in three weeks, re-xray out of boot. Continue with knee immobilizer until follow-up. Patient verbalized understanding of all information and agrees with plan of care. Denies all other concerns present at this time.

## 2018-11-19 ENCOUNTER — OFFICE VISIT (OUTPATIENT)
Dept: ORTHOPEDIC SURGERY | Facility: CLINIC | Age: 48
End: 2018-11-19

## 2018-11-19 DIAGNOSIS — R52 PAIN: Primary | ICD-10-CM

## 2018-11-19 DIAGNOSIS — S92.355D CLOSED NONDISPLACED FRACTURE OF FIFTH METATARSAL BONE OF LEFT FOOT WITH ROUTINE HEALING, SUBSEQUENT ENCOUNTER: ICD-10-CM

## 2018-11-19 PROCEDURE — 99024 POSTOP FOLLOW-UP VISIT: CPT | Performed by: NURSE PRACTITIONER

## 2018-11-19 PROCEDURE — 73630 X-RAY EXAM OF FOOT: CPT | Performed by: NURSE PRACTITIONER

## 2018-11-19 NOTE — PROGRESS NOTES
CC: follow-up nondisplaced fracture fifth metatarsal, left foot 10/07/2018    Interval history: Jitendra Castillo presents back to clinic for three week follow-up. He is improving in regards to left foot, fifth digit. He has continued nonweightbearing left lower extremity. Denies presence of pain left foot. Denies presence of swelling, numbness, tingling and all other concerns.    Exam:  Left foot examined out of boot -   Negative tenderness over fifth metatarsal   Negative swelling, negative erythema   Positive sensation all distributions of left foot  Negative Abad's squeeze test  Negative Homans sign, negative calf tenderness  Flex and extend all digits with pain over fifth metatarsal  2+ pulse    Imaging:  Left Foot X-Ray  Indication: Pain  AP, Lateral, and Oblique views    Findings:  Healing nondisplaced fracture fifth metatarsal   No bony lesion  Normal soft tissues  Normal joint spaces  Prior studies were available for comparison.    Assessment:closed nondisplaced fracture fifth metatarsal left foot, subsequent encounter    Plan:  1. Discussed plan of care with patient. Will now allow him to ambulate while in fracture boot left lower extremity. Discussed with patient that an 8-12 week recovery with fractures such as this in not uncommon.  2. Will plan to see him back in three weeks, repeat x-rays left foot and plan to release him back to full weightbearing in tennis shoe at that time. Encouraged to call with any concerns that he may have. Patient verbalized understanding of all information and agrees with plan of care. Denies all other concerns present at this time.

## 2018-12-10 ENCOUNTER — OFFICE VISIT (OUTPATIENT)
Dept: ORTHOPEDIC SURGERY | Facility: CLINIC | Age: 48
End: 2018-12-10

## 2018-12-10 DIAGNOSIS — S92.355D CLOSED NONDISPLACED FRACTURE OF FIFTH METATARSAL BONE OF LEFT FOOT WITH ROUTINE HEALING, SUBSEQUENT ENCOUNTER: Primary | ICD-10-CM

## 2018-12-10 PROCEDURE — 99024 POSTOP FOLLOW-UP VISIT: CPT | Performed by: NURSE PRACTITIONER

## 2018-12-10 PROCEDURE — 73630 X-RAY EXAM OF FOOT: CPT | Performed by: NURSE PRACTITIONER

## 2018-12-12 NOTE — PROGRESS NOTES
CC: follow-up nondisplaced fracture fifth metatarsal, left foot 10/07/2018     Interval history: Jitendra Castillo presents back to clinic for three week follow-up. He is improving in regards to left foot, fifth digit. Tolerating weightbearing left foot and improving. Denies presence of pain left foot. Denies presence of swelling, numbness, tingling and all other concerns.    Exam:  Left foot -   Negative tenderness over fifth metatarsal   Negative swelling, negative erythema   Positive sensation all distributions of left foot  Negative Abad's squeeze test  Negative Homans sign, negative calf tenderness  Flex and extend all digits with pain over fifth metatarsal  2+ pulse    Imaging:  Left Foot X-Ray  Indication: Pain  AP, Lateral, and Oblique views    Findings:  Healing nondisplaced fracture fifth metatarsal with moderate callus noted   No bony lesion  Normal soft tissues  Normal joint spaces  Prior studies were available for comparison.    Assessment: closed nondisplaced fracture fifth metatarsal left foot, subsequent encounter    Plan:  1. Will proceed with ambulating in shoe as tolerated. Weightbearing as tolerated, rest with swelling or presence of pain.  2. Will see him back in three weeks, repeat x-ray left foot in 3 weeks. Will plan to release pending x-ray results. Patient verbalized understanding of all information and agrees with plan of care. Denies all other concerns present at this time.

## 2018-12-21 RX ORDER — AZELASTINE HCL 205.5 UG/1
SPRAY NASAL
Qty: 30 ML | Refills: 2 | Status: SHIPPED | OUTPATIENT
Start: 2018-12-21 | End: 2019-12-23 | Stop reason: SDUPTHER

## 2019-01-02 RX ORDER — MONTELUKAST SODIUM 10 MG/1
TABLET ORAL
Qty: 90 TABLET | Refills: 1 | Status: SHIPPED | OUTPATIENT
Start: 2019-01-02 | End: 2019-09-08 | Stop reason: SDUPTHER

## 2019-01-24 ENCOUNTER — OFFICE VISIT (OUTPATIENT)
Dept: ORTHOPEDIC SURGERY | Facility: CLINIC | Age: 49
End: 2019-01-24

## 2019-01-24 VITALS — BODY MASS INDEX: 33.6 KG/M2 | HEIGHT: 71 IN | WEIGHT: 240 LBS

## 2019-01-24 DIAGNOSIS — R52 PAIN: Primary | ICD-10-CM

## 2019-01-24 DIAGNOSIS — S92.355D CLOSED NONDISPLACED FRACTURE OF FIFTH METATARSAL BONE OF LEFT FOOT WITH ROUTINE HEALING, SUBSEQUENT ENCOUNTER: ICD-10-CM

## 2019-01-24 PROCEDURE — 73630 X-RAY EXAM OF FOOT: CPT | Performed by: NURSE PRACTITIONER

## 2019-01-24 PROCEDURE — 99024 POSTOP FOLLOW-UP VISIT: CPT | Performed by: NURSE PRACTITIONER

## 2019-01-24 NOTE — PROGRESS NOTES
CC: follow-up nondisplaced fracture fifth metatarsal, left foot 10/07/2018    Interval history: Jitendra Castillo presents back to clinic for follow-up nondisplaced fracture fifth metatarsal left foot.  He has resumed all previously tolerated activities and only experiences pain over the fifth metatarsal with ambulating steps and on concrete such as when he is working.  He has resumed activity on treadmill without any complications.  Is not experiencing swelling nor loss of sensation such as numbness or tingling the left foot.  Denies other concerns present this time.    Exam:  Left foot -   Negative tenderness over fifth metatarsal   Negative swelling, negative erythema   Positive sensation all distributions of left foot  Negative Abad's squeeze test  Negative Homans sign, negative calf tenderness  Flex and extend all digits with pain over fifth metatarsal  2+ pulse    Imaging:  Left Foot X-Ray  Indication: Pain  AP, Lateral, and Oblique views    Findings:  Healing nondisplaced fracture fifth metatarsal   Calcaneal heal spurring at plantar fascia insertion   Normal soft tissues  Normal joint spaces  Prior studies were available for comparison.    Assessment: follow-up nondisplaced fracture fifth metatarsal, left foot    Plan:  1.  Discussed plan of care with patient and will release him to full activity as tolerated.  We'll plan to follow-up with him only as needed.  Patient verbalized understanding of all 3 sugars plan of care.  Denies all other concerns that he has at this time.

## 2019-01-24 NOTE — PROGRESS NOTES
Subjective:     Patient ID: Jitendra Castillo is a 48 y.o. male.    Chief Complaint:    History of Present Illness  Jitendra Castillo       Social History     Occupational History   • Not on file   Tobacco Use   • Smoking status: Former Smoker     Packs/day: 2.00     Years: 5.00     Pack years: 10.00     Types: Cigarettes     Last attempt to quit:      Years since quittin.0   • Smokeless tobacco: Never Used   Substance and Sexual Activity   • Alcohol use: Yes     Comment: 5 per week    • Drug use: No   • Sexual activity: Yes     Partners: Female      Past Medical History:   Diagnosis Date   • Allergic    • Hypertension      Past Surgical History:   Procedure Laterality Date   • FINGER SURGERY     • SHOULDER SURGERY     • TENDON REPAIR Right 2018    Right Bicep       Family History   Problem Relation Age of Onset   • Heart disease Father    • Hypertension Father    • Heart attack Father         s/p cabg in late 50's   • Alzheimer's disease Mother    • No Known Problems Sister    • Hypertension Brother    • No Known Problems Maternal Grandmother    • No Known Problems Maternal Grandfather    • No Known Problems Paternal Grandmother    • No Known Problems Paternal Grandfather    • Nephrolithiasis Brother          Review of Systems   Constitutional: Negative for chills, diaphoresis, fever and unexpected weight change.   HENT: Negative for hearing loss, nosebleeds, sore throat and tinnitus.    Eyes: Negative for pain and visual disturbance.   Respiratory: Negative for cough, shortness of breath and wheezing.    Cardiovascular: Negative for chest pain and palpitations.   Gastrointestinal: Negative for abdominal pain, diarrhea, nausea and vomiting.   Endocrine: Negative for cold intolerance, heat intolerance and polydipsia.   Genitourinary: Negative for difficulty urinating, dysuria and hematuria.   Musculoskeletal: Positive for arthralgias and myalgias. Negative for joint swelling.   Skin: Negative for rash and  "wound.   Allergic/Immunologic: Negative for environmental allergies.   Neurological: Negative for dizziness, syncope and numbness.   Hematological: Does not bruise/bleed easily.   Psychiatric/Behavioral: Negative for dysphoric mood and sleep disturbance. The patient is not nervous/anxious.            Objective:  Vitals:    01/24/19 1050   Weight: 109 kg (240 lb)   Height: 180.3 cm (71\")         01/24/19  1050   Weight: 109 kg (240 lb)     Body mass index is 33.47 kg/m².      Ortho Exam       Imaging:    Assessment:      No diagnosis found.       Plan:    Orders:  No orders of the defined types were placed in this encounter.      I ordered and reviewed the LEVI today.     Dictated utilizing Dragon dictation   "

## 2019-02-11 RX ORDER — LISINOPRIL AND HYDROCHLOROTHIAZIDE 20; 12.5 MG/1; MG/1
TABLET ORAL
Qty: 90 TABLET | Refills: 3 | Status: SHIPPED | OUTPATIENT
Start: 2019-02-11 | End: 2020-03-23

## 2019-05-16 ENCOUNTER — APPOINTMENT (OUTPATIENT)
Dept: SLEEP MEDICINE | Facility: HOSPITAL | Age: 49
End: 2019-05-16
Attending: INTERNAL MEDICINE

## 2019-05-16 NOTE — PROGRESS NOTES
Follow Up [facility] Note     Chief Complaint:  MELLO     Primary Care Physician: Martita Nunes MD    Jitendra Castillo is a 49 y.o.male  was last seen at [facility] sleep lab: May 18, 2019.  Patient had a sleep study in  for he was diagnosed with moderate sleep apnea with AHI of 25.  Reports that symptoms continue to be under control with his current CPAP settings.    Results Review:  DME is ***.  Downloads between ***.  Average usage is ***.  Average AHI is ***.  Average AutoCPAP pressure is ***.    Current Medications:    Current Outpatient Medications:   •  azelastine (ASTEPRO) 0.15 % solution nasal spray, USE 2 SPRAYS IN EACH NOSTRIL TWICE DAILY, Disp: 30 mL, Rfl: 2  •  fluticasone (FLONASE) 50 MCG/ACT nasal spray, 2 sprays into the nostril(s) as directed by provider Daily., Disp: , Rfl:   •  lisinopril-hydrochlorothiazide (PRINZIDE,ZESTORETIC) 20-12.5 MG per tablet, TAKE ONE TABLET BY MOUTH ONCE DAILY, Disp: 90 tablet, Rfl: 3  •  montelukast (SINGULAIR) 10 MG tablet, TAKE 1 TABLET BY MOUTH EVERY NIGHT, Disp: 90 tablet, Rfl: 1  •  Multiple Vitamin (DAILY VITAMIN PO), Take  by mouth Daily., Disp: , Rfl:    also entered in Sleep Questionnaire    Patient  has a past medical history of Allergic and Hypertension.    Social History:    Social History     Socioeconomic History   • Marital status:      Spouse name: Not on file   • Number of children: Not on file   • Years of education: Not on file   • Highest education level: Not on file   Tobacco Use   • Smoking status: Former Smoker     Packs/day: 2.00     Years: 5.00     Pack years: 10.00     Types: Cigarettes     Last attempt to quit:      Years since quittin.3   • Smokeless tobacco: Never Used   Substance and Sexual Activity   • Alcohol use: Yes     Comment: 5 per week    • Drug use: No   • Sexual activity: Yes     Partners: Female       Allergies:  Patient has no known allergies.    Review of Systems negative except for:  ***    Vital Signs:  There were no vitals filed for this visit.  There is no height or weight on file to calculate BMI.    Vital Signs There were no vitals taken for this visit. There is no height or weight on file to calculate BMI.    General Alert and oriented. No acute distress noted   Pharynx/Throat Class IV Mallampati airway, large tongue, no evidence of redundant lateral pharyngeal tissue. No oral lesions. No thrush. Moist mucous membranes.   Head Normocephalic. Symmetrical. Atraumatic.    Nose No septal deviation. No drainage   Chest Wall Normal shape. Symmetric expansion with respiration. No tenderness.   Neck Trachea midline, no thyromegaly or adenopathy    Lungs Clear to auscultation bilaterally. No wheezes. No rhonchi. No rales. Respirations regular, even and unlabored.   Heart Regular rhythm and normal rate. Normal S1 and S2. No murmur   Abdomen Soft, non-tender and non-distended. Normal bowel sounds. No masses.   Extremities Moves all extremities well. No edema   Psychiatric Normal mood and affect.     Impression:  1. MELLO on CPAP        Obstructive sleep apnea adequately treated with *** with good compliance and usage and no complaints of hypersomnolence.    Patient uses the CPAP device and benefits from its use in terms of reduction of hypersomnia and snoring.Weight loss will be strongly beneficial to reduce the severity of sleep-disordered breathing.  Caution during activities that require prolonged concentration is strongly advised if sleepiness returns. Changing of PAP supplies regularly is important for effective use. Patient needs to change cushion on the mask or plugs on nasal pillows along with disposable filters once every month and change mask frame, tubing, headgear and Velcro straps every 6 months at the minimum.    Marcelina Piedra MD  Sleep Medicine  05/16/19  8:27 AM

## 2019-09-09 RX ORDER — MONTELUKAST SODIUM 10 MG/1
TABLET ORAL
Qty: 90 TABLET | Refills: 1 | Status: SHIPPED | OUTPATIENT
Start: 2019-09-09 | End: 2020-07-15 | Stop reason: SDUPTHER

## 2019-12-23 ENCOUNTER — TELEPHONE (OUTPATIENT)
Dept: INTERNAL MEDICINE | Facility: CLINIC | Age: 49
End: 2019-12-23

## 2019-12-23 RX ORDER — AZELASTINE HCL 205.5 UG/1
2 SPRAY NASAL 2 TIMES DAILY
Qty: 30 ML | Refills: 2 | Status: SHIPPED | OUTPATIENT
Start: 2019-12-23

## 2019-12-23 NOTE — TELEPHONE ENCOUNTER
Patient request script be sent to pharmacy, [facility] in [facility].  azelastine (ASTEPRO) 0.15 % solution nasal spray  [place] and has appointment with a new PCP but not until February.

## 2020-03-23 RX ORDER — LISINOPRIL AND HYDROCHLOROTHIAZIDE 20; 12.5 MG/1; MG/1
TABLET ORAL
Qty: 90 TABLET | Refills: 0 | Status: SHIPPED | OUTPATIENT
Start: 2020-03-23 | End: 2020-06-29

## 2020-06-29 RX ORDER — LISINOPRIL AND HYDROCHLOROTHIAZIDE 20; 12.5 MG/1; MG/1
TABLET ORAL
Qty: 90 TABLET | Refills: 0 | Status: SHIPPED | OUTPATIENT
Start: 2020-06-29 | End: 2020-07-13

## 2020-07-13 RX ORDER — LISINOPRIL AND HYDROCHLOROTHIAZIDE 20; 12.5 MG/1; MG/1
TABLET ORAL
Qty: 90 TABLET | Refills: 0 | Status: SHIPPED | OUTPATIENT
Start: 2020-07-13

## 2020-07-15 RX ORDER — MONTELUKAST SODIUM 10 MG/1
10 TABLET ORAL NIGHTLY
Qty: 90 TABLET | Refills: 1 | Status: SHIPPED | OUTPATIENT
Start: 2020-07-15

## 2021-04-19 RX ORDER — AZELASTINE HCL 205.5 UG/1
SPRAY NASAL
Qty: 30 ML | Refills: 0 | OUTPATIENT
Start: 2021-04-19

## 2021-04-19 NOTE — TELEPHONE ENCOUNTER
"HUB PLEASE INFORM PATIENT, \" PT NEEDS TO SCHEDULE AN APPT BEFORE MEDICATION CAN BE REFILLED.\"  "

## 2024-04-30 ENCOUNTER — TELEPHONE (OUTPATIENT)
Dept: INTERNAL MEDICINE | Facility: CLINIC | Age: 54
End: 2024-04-30
Payer: COMMERCIAL

## 2024-04-30 NOTE — TELEPHONE ENCOUNTER
Called wanting to know information about patient, dates of visits, cpap info, etc. Will be sending a request for information to [phone], just look for and call them at number provided

## 2024-05-02 NOTE — TELEPHONE ENCOUNTER
Patient has not been seen by Dr. Nunes since 10/8/2018. Patient is no longer under Dr. Nunes's care.